# Patient Record
Sex: FEMALE | Race: WHITE | Employment: UNEMPLOYED | URBAN - METROPOLITAN AREA
[De-identification: names, ages, dates, MRNs, and addresses within clinical notes are randomized per-mention and may not be internally consistent; named-entity substitution may affect disease eponyms.]

---

## 2018-02-21 ENCOUNTER — HOSPITAL ENCOUNTER (OUTPATIENT)
Dept: OTHER | Age: 33
Discharge: HOME | End: 2018-02-21
Attending: EMERGENCY MEDICINE | Admitting: EMERGENCY MEDICINE

## 2018-02-22 LAB
CULTURE: NORMAL
RAPID INFLUENZA  B AGN: NEGATIVE
RAPID INFLUENZA A AGN: POSITIVE
REPORT STATUS: NORMAL
REQUEST PROBLEM: NORMAL
SPECIMEN: NORMAL
STREP A DIRECT SCREEN: NORMAL
STREP A DIRECT SCREEN: NORMAL
STREP A DIRECT SCREEN: POSITIVE

## 2018-04-19 ENCOUNTER — HOSPITAL ENCOUNTER (OUTPATIENT)
Dept: OTHER | Age: 33
Discharge: HOME | End: 2018-04-19
Attending: INTERNAL MEDICINE | Admitting: INTERNAL MEDICINE

## 2018-04-19 LAB
ALBUMIN SERPL-MCNC: 4.4 GM/DL (ref 3.4–5)
ALP BLD-CCNC: 61 IU/L (ref 40–129)
ALT SERPL-CCNC: 14 U/L (ref 10–40)
AST SERPL-CCNC: 11 IU/L (ref 15–37)
BILIRUB SERPL-MCNC: 0.3 MG/DL (ref 0–1)
BILIRUBIN DIRECT: 0.2 MG/DL (ref 0–0.3)
BILIRUBIN, INDIRECT: 0.1 MG/DL (ref 0–0.7)
HEPATITIS B SURFACE ANTIGEN: NON REACTIVE
HEPATITIS C ANTIBODY: NON REACTIVE
TOTAL PROTEIN: 7 GM/DL (ref 6.4–8.2)

## 2018-04-20 LAB — HIV SCREEN: NON REACTIVE

## 2020-02-12 ENCOUNTER — HOSPITAL ENCOUNTER (OUTPATIENT)
Age: 35
Discharge: HOME | DRG: 560 | End: 2020-02-13
Attending: OBSTETRICS & GYNECOLOGY | Admitting: OBSTETRICS & GYNECOLOGY
Payer: MEDICAID

## 2020-02-12 PROCEDURE — 99211 OFF/OP EST MAY X REQ PHY/QHP: CPT

## 2020-02-12 ASSESSMENT — PAIN DESCRIPTION - DESCRIPTORS: DESCRIPTORS: CRAMPING

## 2020-02-13 ENCOUNTER — APPOINTMENT (OUTPATIENT)
Dept: ULTRASOUND IMAGING | Age: 35
DRG: 560 | End: 2020-02-13
Payer: MEDICAID

## 2020-02-13 VITALS
BODY MASS INDEX: 39.88 KG/M2 | HEART RATE: 72 BPM | HEIGHT: 63 IN | SYSTOLIC BLOOD PRESSURE: 126 MMHG | RESPIRATION RATE: 16 BRPM | WEIGHT: 225.09 LBS | TEMPERATURE: 96.1 F | DIASTOLIC BLOOD PRESSURE: 81 MMHG

## 2020-02-13 LAB
ABO/RH: NORMAL
AMPHETAMINES: NEGATIVE
ANTIBODY SCREEN: NEGATIVE
BACTERIA: NEGATIVE /HPF
BARBITURATE SCREEN URINE: NEGATIVE
BASOPHILS ABSOLUTE: 0 K/CU MM
BASOPHILS RELATIVE PERCENT: 0.3 % (ref 0–1)
BENZODIAZEPINE SCREEN, URINE: NEGATIVE
BILIRUBIN URINE: NEGATIVE MG/DL
BLOOD, URINE: ABNORMAL
CANNABINOID SCREEN URINE: NEGATIVE
CLARITY: ABNORMAL
COCAINE METABOLITE: ABNORMAL
COLOR: ABNORMAL
DIFFERENTIAL TYPE: ABNORMAL
EOSINOPHILS ABSOLUTE: 0 K/CU MM
EOSINOPHILS RELATIVE PERCENT: 0.1 % (ref 0–3)
ESTIMATED AVERAGE GLUCOSE: 85 MG/DL
GLUCOSE, URINE: NEGATIVE MG/DL
HBA1C MFR BLD: 4.6 % (ref 4.2–6.3)
HCT VFR BLD CALC: 37.8 % (ref 37–47)
HEMOGLOBIN: 12.2 GM/DL (ref 12.5–16)
HEPATITIS B SURFACE ANTIGEN: NON REACTIVE
HEPATITIS C ANTIBODY: NON REACTIVE
IMMATURE NEUTROPHIL %: 0.7 % (ref 0–0.43)
KETONES, URINE: NEGATIVE MG/DL
LEUKOCYTE ESTERASE, URINE: ABNORMAL
LYMPHOCYTES ABSOLUTE: 2.4 K/CU MM
LYMPHOCYTES RELATIVE PERCENT: 24.4 % (ref 24–44)
MCH RBC QN AUTO: 26.8 PG (ref 27–31)
MCHC RBC AUTO-ENTMCNC: 32.3 % (ref 32–36)
MCV RBC AUTO: 82.9 FL (ref 78–100)
MONOCYTES ABSOLUTE: 0.4 K/CU MM
MONOCYTES RELATIVE PERCENT: 4 % (ref 0–4)
MUCUS: ABNORMAL HPF
NITRITE URINE, QUANTITATIVE: NEGATIVE
NUCLEATED RBC %: 0 %
OPIATES, URINE: NEGATIVE
OXYCODONE: ABNORMAL
PDW BLD-RTO: 14.3 % (ref 11.7–14.9)
PH, URINE: 5 (ref 5–8)
PHENCYCLIDINE, URINE: NEGATIVE
PLATELET # BLD: 257 K/CU MM (ref 140–440)
PMV BLD AUTO: 9.2 FL (ref 7.5–11.1)
PROTEIN UA: 30 MG/DL
RBC # BLD: 4.56 M/CU MM (ref 4.2–5.4)
RBC URINE: 6 /HPF (ref 0–6)
RPR: NON REACTIVE
SEGMENTED NEUTROPHILS ABSOLUTE COUNT: 7 K/CU MM
SEGMENTED NEUTROPHILS RELATIVE PERCENT: 70.5 % (ref 36–66)
SPECIFIC GRAVITY UA: 1.03 (ref 1–1.03)
SPECIFIC GRAVITY UA: ABNORMAL (ref 1–1.03)
SQUAMOUS EPITHELIAL: 6 /HPF
TOTAL IMMATURE NEUTOROPHIL: 0.07 K/CU MM
TOTAL NUCLEATED RBC: 0 K/CU MM
TRICHOMONAS: ABNORMAL /HPF
UROBILINOGEN, URINE: 1 MG/DL (ref 0.2–1)
WBC # BLD: 9.9 K/CU MM (ref 4–10.5)
WBC UA: 8 /HPF (ref 0–5)

## 2020-02-13 PROCEDURE — 76805 OB US >/= 14 WKS SNGL FETUS: CPT

## 2020-02-13 PROCEDURE — 83036 HEMOGLOBIN GLYCOSYLATED A1C: CPT

## 2020-02-13 PROCEDURE — 80307 DRUG TEST PRSMV CHEM ANLYZR: CPT

## 2020-02-13 PROCEDURE — 87081 CULTURE SCREEN ONLY: CPT

## 2020-02-13 PROCEDURE — 87389 HIV-1 AG W/HIV-1&-2 AB AG IA: CPT

## 2020-02-13 PROCEDURE — 87591 N.GONORRHOEAE DNA AMP PROB: CPT

## 2020-02-13 PROCEDURE — 87491 CHLMYD TRACH DNA AMP PROBE: CPT

## 2020-02-13 PROCEDURE — 86803 HEPATITIS C AB TEST: CPT

## 2020-02-13 PROCEDURE — 81001 URINALYSIS AUTO W/SCOPE: CPT

## 2020-02-13 ASSESSMENT — PAIN DESCRIPTION - DESCRIPTORS: DESCRIPTORS: CRAMPING

## 2020-02-13 NOTE — H&P
OB LABORIST NOTE     CHIEF COMPLAINT:  This is a 29y.o.  year old  at Unknown  with no prenatal care, presents after \"losing her mucous plug\", thinking she may be in labor. She has seen no doctor at all in pregnancy and thinks her LMP was [place] sometime. She hasn't because \"I'm a drug addict\" and she plans to adopt out this baby. She reports to daily cocaine use, last was Tuesday early am \"still dark out\". Also admits to smoking 1/2 ppd but denies alcohol, or any other drug use or prescription use in pregnancy. She knew she was pregnant due to +GFM but has had no ultrasounds at all. She said she called an agency called [facility] in another state just yesterday, but has not met with anyone or signed any papers, nor picked a family. Pt reports her other 3 kids are in the custody of their father, and she does not see them. She is homeless, sleeps on [employer] couches, has no support system, and has not been in [employer] or any program like that, except \"when I was in [employer]". FOB not involved. REVIEW OF SYSTEMS: negative other than listed above.     PMH:   Past Medical History:   Diagnosis Date    Ankle fracture, right 2011    Chronic back pain     Pregnancy     2nd trimester    Syphilis     Tendinitis of right ankle      PSH:   Past Surgical History:   Procedure Laterality Date    CHOLECYSTECTOMY      HERNIA REPAIR        Social History:   Social History     Socioeconomic History    Marital status: Single     Spouse name: Not on file    Number of children: Not on file    Years of education: Not on file    Highest education level: Not on file   Occupational History    Not on file   Social Needs    Financial resource strain: Not on file    Food insecurity:     Worry: Not on file     Inability: Not on file    Transportation needs:     Medical: Not on file     Non-medical: Not on file   Tobacco Use    Smoking status: Current Every Day Smoker     Packs/day: 0.25     Years: 5.00 Posterior tibial tendinitis 01/12/2012    Peroneal tendinitis 01/12/2012        PLAN:  1. Admit  2. Prenatal panel, all swabs, and ultrasound  3. Pt desires bps if possible, will sign medicaid consent if we can get one. 4. If in labor, proceed. If not, contact social svs for any assistance.

## 2020-02-13 NOTE — PROGRESS NOTES
Patient arrives ambulatory with concerns of contractions. Escorted to room LT02. Oriented to room and call light. Instructed to change into gown and obtain CCMSUA.

## 2020-02-13 NOTE — PROGRESS NOTES
Dr. [facility] at patient bedside reviewed patient medical and OB history. Blood sample and swabs obtained for ordered lab work. POC reviewed. Patient voices understanding and agreement.

## 2020-02-13 NOTE — PROGRESS NOTES
EFM and TOCO applied. Patient reports she is pregnant and has no prenatal care. Reports last menstrual cycle was possible in June. Patient reports that she use cocaine daily last used yesterday stopped when she lost her mucous plug. Patient reports contractions ongoing since then. POC reviewed. Patient voices understanding. Patient cooperative.

## 2020-02-14 LAB
HIV SCREEN: NON REACTIVE
RUBELLA ANTIBODY IGG: 16.8 IU/ML
RUBELLA ANTIBODY IGG: NORMAL IU/ML

## 2020-02-14 NOTE — FLOWSHEET NOTE
Patient ambulated out of [facility], upon discharge, ride is here, no distress noted.

## 2020-02-14 NOTE — FLOWSHEET NOTE
Discharge instructions given to pt and pt verbalized understanding and will be leaving unit when her friend arrives.

## 2020-02-15 ENCOUNTER — ANESTHESIA EVENT (OUTPATIENT)
Dept: LABOR AND DELIVERY | Age: 35
DRG: 560 | End: 2020-02-15
Payer: MEDICAID

## 2020-02-15 ENCOUNTER — ANESTHESIA (OUTPATIENT)
Dept: LABOR AND DELIVERY | Age: 35
DRG: 560 | End: 2020-02-15
Payer: MEDICAID

## 2020-02-15 ENCOUNTER — HOSPITAL ENCOUNTER (INPATIENT)
Age: 35
LOS: 1 days | Discharge: HOME | DRG: 560 | End: 2020-02-16
Attending: OBSTETRICS & GYNECOLOGY | Admitting: OBSTETRICS & GYNECOLOGY
Payer: MEDICAID

## 2020-02-15 LAB
ABO/RH: NORMAL
AMPHETAMINES: NEGATIVE
ANTIBODY SCREEN: NEGATIVE
BACTERIA: ABNORMAL /HPF
BARBITURATE SCREEN URINE: NEGATIVE
BENZODIAZEPINE SCREEN, URINE: NEGATIVE
BILIRUBIN URINE: NEGATIVE MG/DL
BLOOD, URINE: ABNORMAL
CANNABINOID SCREEN URINE: NEGATIVE
CHLAMYDIA TRACHOMATIS AMPLIFIED DET: NEGATIVE
CHLAMYDIA TRACHOMATIS AMPLIFIED DET: NORMAL
CLARITY: ABNORMAL
COCAINE METABOLITE: ABNORMAL
COLOR: YELLOW
GLUCOSE, URINE: NEGATIVE MG/DL
HCT VFR BLD CALC: 37.4 % (ref 37–47)
HEMOGLOBIN: 11.9 GM/DL (ref 12.5–16)
KETONES, URINE: NEGATIVE MG/DL
LEUKOCYTE ESTERASE, URINE: ABNORMAL
MCH RBC QN AUTO: 26.6 PG (ref 27–31)
MCHC RBC AUTO-ENTMCNC: 31.8 % (ref 32–36)
MCV RBC AUTO: 83.5 FL (ref 78–100)
MUCUS: ABNORMAL HPF
N GONORRHOEAE AMPLIFIED DET: NEGATIVE
N GONORRHOEAE AMPLIFIED DET: NORMAL
NITRITE URINE, QUANTITATIVE: NEGATIVE
OPIATES, URINE: NEGATIVE
OXYCODONE: ABNORMAL
PDW BLD-RTO: 14.6 % (ref 11.7–14.9)
PH, URINE: 6 (ref 5–8)
PHENCYCLIDINE, URINE: NEGATIVE
PLATELET # BLD: 256 K/CU MM (ref 140–440)
PMV BLD AUTO: 9.3 FL (ref 7.5–11.1)
PROTEIN UA: NEGATIVE MG/DL
RBC # BLD: 4.48 M/CU MM (ref 4.2–5.4)
RBC URINE: 25 /HPF (ref 0–6)
SPECIFIC GRAVITY UA: 1.02 (ref 1–1.03)
SQUAMOUS EPITHELIAL: 9 /HPF
TRICHOMONAS: ABNORMAL /HPF
UROBILINOGEN, URINE: NORMAL MG/DL (ref 0.2–1)
WBC # BLD: 8.8 K/CU MM (ref 4–10.5)
WBC UA: 23 /HPF (ref 0–5)

## 2020-02-15 PROCEDURE — 1220000000 HC SEMI PRIVATE OB R&B

## 2020-02-15 PROCEDURE — 2580000003 HC RX 258

## 2020-02-15 PROCEDURE — 81001 URINALYSIS AUTO W/SCOPE: CPT

## 2020-02-15 PROCEDURE — 88307 TISSUE EXAM BY PATHOLOGIST: CPT

## 2020-02-15 PROCEDURE — 6370000000 HC RX 637 (ALT 250 FOR IP): Performed by: OBSTETRICS & GYNECOLOGY

## 2020-02-15 PROCEDURE — 85027 COMPLETE CBC AUTOMATED: CPT

## 2020-02-15 PROCEDURE — 6360000002 HC RX W HCPCS

## 2020-02-15 PROCEDURE — 86900 BLOOD TYPING SEROLOGIC ABO: CPT

## 2020-02-15 PROCEDURE — 10907ZC DRAINAGE OF AMNIOTIC FLUID, THERAPEUTIC FROM PRODUCTS OF CONCEPTION, VIA NATURAL OR ARTIFICIAL OPENING: ICD-10-PCS | Performed by: OBSTETRICS & GYNECOLOGY

## 2020-02-15 PROCEDURE — 86850 RBC ANTIBODY SCREEN: CPT

## 2020-02-15 PROCEDURE — 2500000003 HC RX 250 WO HCPCS: Performed by: NURSE ANESTHETIST, CERTIFIED REGISTERED

## 2020-02-15 PROCEDURE — 7200000001 HC VAGINAL DELIVERY

## 2020-02-15 PROCEDURE — 80307 DRUG TEST PRSMV CHEM ANLYZR: CPT

## 2020-02-15 PROCEDURE — 6360000002 HC RX W HCPCS: Performed by: NURSE ANESTHETIST, CERTIFIED REGISTERED

## 2020-02-15 PROCEDURE — 86901 BLOOD TYPING SEROLOGIC RH(D): CPT

## 2020-02-15 PROCEDURE — 3700000025 EPIDURAL BLOCK: Performed by: NURSE ANESTHETIST, CERTIFIED REGISTERED

## 2020-02-15 PROCEDURE — 6360000002 HC RX W HCPCS: Performed by: OBSTETRICS & GYNECOLOGY

## 2020-02-15 PROCEDURE — 2580000003 HC RX 258: Performed by: OBSTETRICS & GYNECOLOGY

## 2020-02-15 RX ORDER — LIDOCAINE HYDROCHLORIDE AND EPINEPHRINE 15; 5 MG/ML; UG/ML
INJECTION, SOLUTION EPIDURAL PRN
Status: DISCONTINUED | OUTPATIENT
Start: 2020-02-15 | End: 2020-02-15 | Stop reason: SDUPTHER

## 2020-02-15 RX ORDER — ACETAMINOPHEN AND CODEINE PHOSPHATE 300; 30 MG/1; MG/1
1 TABLET ORAL EVERY 6 HOURS PRN
Qty: 18 TABLET | Refills: 0 | Status: SHIPPED | OUTPATIENT
Start: 2020-02-15 | End: 2020-02-22

## 2020-02-15 RX ORDER — FENTANYL CITRATE 50 UG/ML
100 INJECTION, SOLUTION INTRAMUSCULAR; INTRAVENOUS
Status: DISCONTINUED | OUTPATIENT
Start: 2020-02-15 | End: 2020-02-15 | Stop reason: HOSPADM

## 2020-02-15 RX ORDER — SODIUM CHLORIDE 0.9 % (FLUSH) 0.9 %
10 SYRINGE (ML) INJECTION EVERY 12 HOURS SCHEDULED
Status: DISCONTINUED | OUTPATIENT
Start: 2020-02-15 | End: 2020-02-16 | Stop reason: HOSPADM

## 2020-02-15 RX ORDER — ROPIVACAINE HYDROCHLORIDE 2 MG/ML
12 INJECTION, SOLUTION EPIDURAL; INFILTRATION; PERINEURAL CONTINUOUS
Status: DISCONTINUED | OUTPATIENT
Start: 2020-02-15 | End: 2020-02-16 | Stop reason: HOSPADM

## 2020-02-15 RX ORDER — MAGNESIUM SULFATE 4 G/50ML
INJECTION INTRAVENOUS
Status: COMPLETED
Start: 2020-02-15 | End: 2020-02-15

## 2020-02-15 RX ORDER — SODIUM CHLORIDE 0.9 % (FLUSH) 0.9 %
10 SYRINGE (ML) INJECTION EVERY 12 HOURS SCHEDULED
Status: CANCELLED | OUTPATIENT
Start: 2020-02-15

## 2020-02-15 RX ORDER — ROPIVACAINE HYDROCHLORIDE 2 MG/ML
INJECTION, SOLUTION EPIDURAL; INFILTRATION; PERINEURAL PRN
Status: DISCONTINUED | OUTPATIENT
Start: 2020-02-15 | End: 2020-02-15 | Stop reason: SDUPTHER

## 2020-02-15 RX ORDER — BETAMETHASONE SODIUM PHOSPHATE AND BETAMETHASONE ACETATE 3; 3 MG/ML; MG/ML
12 INJECTION, SUSPENSION INTRA-ARTICULAR; INTRALESIONAL; INTRAMUSCULAR; SOFT TISSUE ONCE
Status: COMPLETED | OUTPATIENT
Start: 2020-02-15 | End: 2020-02-15

## 2020-02-15 RX ORDER — SODIUM CHLORIDE, SODIUM LACTATE, POTASSIUM CHLORIDE, CALCIUM CHLORIDE 600; 310; 30; 20 MG/100ML; MG/100ML; MG/100ML; MG/100ML
INJECTION, SOLUTION INTRAVENOUS CONTINUOUS
Status: DISCONTINUED | OUTPATIENT
Start: 2020-02-15 | End: 2020-02-16 | Stop reason: HOSPADM

## 2020-02-15 RX ORDER — ONDANSETRON 2 MG/ML
4 INJECTION INTRAMUSCULAR; INTRAVENOUS EVERY 6 HOURS PRN
Status: DISCONTINUED | OUTPATIENT
Start: 2020-02-15 | End: 2020-02-15 | Stop reason: HOSPADM

## 2020-02-15 RX ORDER — ONDANSETRON 2 MG/ML
4 INJECTION INTRAMUSCULAR; INTRAVENOUS EVERY 6 HOURS PRN
Status: CANCELLED | OUTPATIENT
Start: 2020-02-15

## 2020-02-15 RX ORDER — DOCUSATE SODIUM 100 MG/1
100 CAPSULE, LIQUID FILLED ORAL 2 TIMES DAILY
Status: DISCONTINUED | OUTPATIENT
Start: 2020-02-15 | End: 2020-02-16 | Stop reason: HOSPADM

## 2020-02-15 RX ORDER — LANOLIN 100 %
OINTMENT (GRAM) TOPICAL PRN
Status: DISCONTINUED | OUTPATIENT
Start: 2020-02-15 | End: 2020-02-16 | Stop reason: HOSPADM

## 2020-02-15 RX ORDER — NALOXONE HYDROCHLORIDE 0.4 MG/ML
0.4 INJECTION, SOLUTION INTRAMUSCULAR; INTRAVENOUS; SUBCUTANEOUS PRN
Status: DISCONTINUED | OUTPATIENT
Start: 2020-02-15 | End: 2020-02-15 | Stop reason: HOSPADM

## 2020-02-15 RX ORDER — IBUPROFEN 800 MG/1
800 TABLET ORAL EVERY 8 HOURS
Status: CANCELLED | OUTPATIENT
Start: 2020-02-16

## 2020-02-15 RX ORDER — ACETAMINOPHEN 325 MG/1
650 TABLET ORAL EVERY 4 HOURS PRN
Status: CANCELLED | OUTPATIENT
Start: 2020-02-15

## 2020-02-15 RX ORDER — IBUPROFEN 800 MG/1
800 TABLET ORAL EVERY 8 HOURS
Status: DISCONTINUED | OUTPATIENT
Start: 2020-02-15 | End: 2020-02-16 | Stop reason: HOSPADM

## 2020-02-15 RX ORDER — DOCUSATE SODIUM 100 MG/1
100 CAPSULE, LIQUID FILLED ORAL 2 TIMES DAILY
Status: CANCELLED | OUTPATIENT
Start: 2020-02-15

## 2020-02-15 RX ORDER — ROPIVACAINE HYDROCHLORIDE 2 MG/ML
INJECTION, SOLUTION EPIDURAL; INFILTRATION; PERINEURAL CONTINUOUS PRN
Status: DISCONTINUED | OUTPATIENT
Start: 2020-02-15 | End: 2020-02-15 | Stop reason: SDUPTHER

## 2020-02-15 RX ORDER — LANOLIN 100 %
OINTMENT (GRAM) TOPICAL PRN
Status: CANCELLED | OUTPATIENT
Start: 2020-02-15

## 2020-02-15 RX ORDER — FAMOTIDINE 20 MG/1
20 TABLET, FILM COATED ORAL 2 TIMES DAILY PRN
Status: DISCONTINUED | OUTPATIENT
Start: 2020-02-15 | End: 2020-02-16 | Stop reason: HOSPADM

## 2020-02-15 RX ORDER — ACETAMINOPHEN 325 MG/1
650 TABLET ORAL EVERY 4 HOURS PRN
Status: DISCONTINUED | OUTPATIENT
Start: 2020-02-15 | End: 2020-02-16 | Stop reason: HOSPADM

## 2020-02-15 RX ORDER — MISOPROSTOL 200 UG/1
800 TABLET ORAL PRN
Status: DISCONTINUED | OUTPATIENT
Start: 2020-02-15 | End: 2020-02-16 | Stop reason: HOSPADM

## 2020-02-15 RX ORDER — ONDANSETRON 4 MG/1
4 TABLET, ORALLY DISINTEGRATING ORAL EVERY 6 HOURS PRN
Status: DISCONTINUED | OUTPATIENT
Start: 2020-02-15 | End: 2020-02-16 | Stop reason: HOSPADM

## 2020-02-15 RX ORDER — SODIUM CHLORIDE 0.9 % (FLUSH) 0.9 %
10 SYRINGE (ML) INJECTION PRN
Status: CANCELLED | OUTPATIENT
Start: 2020-02-15

## 2020-02-15 RX ORDER — MAGNESIUM SULFATE 4 G/50ML
4 INJECTION INTRAVENOUS ONCE
Status: COMPLETED | OUTPATIENT
Start: 2020-02-15 | End: 2020-02-15

## 2020-02-15 RX ORDER — NICOTINE 21 MG/24HR
1 PATCH, TRANSDERMAL 24 HOURS TRANSDERMAL DAILY
Status: DISCONTINUED | OUTPATIENT
Start: 2020-02-15 | End: 2020-02-16 | Stop reason: HOSPADM

## 2020-02-15 RX ORDER — FAMOTIDINE 20 MG/1
20 TABLET, FILM COATED ORAL 2 TIMES DAILY PRN
Status: CANCELLED | OUTPATIENT
Start: 2020-02-15

## 2020-02-15 RX ORDER — SODIUM CHLORIDE, SODIUM LACTATE, POTASSIUM CHLORIDE, CALCIUM CHLORIDE 600; 310; 30; 20 MG/100ML; MG/100ML; MG/100ML; MG/100ML
INJECTION, SOLUTION INTRAVENOUS
Status: COMPLETED
Start: 2020-02-15 | End: 2020-02-15

## 2020-02-15 RX ORDER — ACETAMINOPHEN AND CODEINE PHOSPHATE 300; 30 MG/1; MG/1
2 TABLET ORAL EVERY 6 HOURS PRN
Status: CANCELLED | OUTPATIENT
Start: 2020-02-15

## 2020-02-15 RX ORDER — IBUPROFEN 600 MG/1
600 TABLET ORAL 3 TIMES DAILY PRN
Qty: 30 TABLET | Refills: 0 | Status: SHIPPED | OUTPATIENT
Start: 2020-02-15

## 2020-02-15 RX ORDER — METHYLERGONOVINE MALEATE 0.2 MG/ML
200 INJECTION INTRAVENOUS
Status: ACTIVE | OUTPATIENT
Start: 2020-02-15 | End: 2020-02-15

## 2020-02-15 RX ORDER — SODIUM CHLORIDE 0.9 % (FLUSH) 0.9 %
10 SYRINGE (ML) INJECTION PRN
Status: DISCONTINUED | OUTPATIENT
Start: 2020-02-15 | End: 2020-02-16 | Stop reason: HOSPADM

## 2020-02-15 RX ORDER — SIMETHICONE 80 MG
80 TABLET,CHEWABLE ORAL EVERY 6 HOURS PRN
Status: DISCONTINUED | OUTPATIENT
Start: 2020-02-15 | End: 2020-02-16 | Stop reason: HOSPADM

## 2020-02-15 RX ORDER — ONDANSETRON 4 MG/1
4 TABLET, FILM COATED ORAL EVERY 8 HOURS PRN
Status: CANCELLED | OUTPATIENT
Start: 2020-02-15

## 2020-02-15 RX ORDER — FERROUS SULFATE 325(65) MG
325 TABLET ORAL 2 TIMES DAILY
Qty: 60 TABLET | Refills: 3 | Status: SHIPPED | OUTPATIENT
Start: 2020-02-15

## 2020-02-15 RX ORDER — DOCUSATE SODIUM 100 MG/1
100 CAPSULE, LIQUID FILLED ORAL 2 TIMES DAILY
Status: DISCONTINUED | OUTPATIENT
Start: 2020-02-15 | End: 2020-02-15 | Stop reason: HOSPADM

## 2020-02-15 RX ORDER — ACETAMINOPHEN AND CODEINE PHOSPHATE 300; 30 MG/1; MG/1
1 TABLET ORAL EVERY 6 HOURS PRN
Status: CANCELLED | OUTPATIENT
Start: 2020-02-15

## 2020-02-15 RX ADMIN — SODIUM CHLORIDE, POTASSIUM CHLORIDE, SODIUM LACTATE AND CALCIUM CHLORIDE: 600; 310; 30; 20 INJECTION, SOLUTION INTRAVENOUS at 07:20

## 2020-02-15 RX ADMIN — LIDOCAINE HYDROCHLORIDE,EPINEPHRINE BITARTRATE 3 ML: 15; .005 INJECTION, SOLUTION EPIDURAL; INFILTRATION; INTRACAUDAL; PERINEURAL at 10:50

## 2020-02-15 RX ADMIN — ROPIVACAINE HYDROCHLORIDE 12 ML/HR: 2 INJECTION, SOLUTION EPIDURAL; INFILTRATION at 10:57

## 2020-02-15 RX ADMIN — Medication 250 MILLI-UNITS/MIN: at 15:23

## 2020-02-15 RX ADMIN — BETAMETHASONE SODIUM PHOSPHATE AND BETAMETHASONE ACETATE 12 MG: 3; 3 INJECTION, SUSPENSION INTRA-ARTICULAR; INTRALESIONAL; INTRAMUSCULAR at 07:50

## 2020-02-15 RX ADMIN — AMPICILLIN SODIUM 2 G: 2 INJECTION, POWDER, FOR SOLUTION INTRAMUSCULAR; INTRAVENOUS at 07:37

## 2020-02-15 RX ADMIN — AMPICILLIN SODIUM 1 G: 1 INJECTION, POWDER, FOR SOLUTION INTRAMUSCULAR; INTRAVENOUS at 11:35

## 2020-02-15 RX ADMIN — DOCUSATE SODIUM 100 MG: 100 CAPSULE, LIQUID FILLED ORAL at 22:52

## 2020-02-15 RX ADMIN — ROPIVACAINE HYDROCHLORIDE 4 ML: 2 INJECTION, SOLUTION EPIDURAL; INFILTRATION at 10:55

## 2020-02-15 RX ADMIN — Medication 999 MILLI-UNITS/MIN: at 14:17

## 2020-02-15 RX ADMIN — SODIUM CHLORIDE, POTASSIUM CHLORIDE, SODIUM LACTATE AND CALCIUM CHLORIDE: 600; 310; 30; 20 INJECTION, SOLUTION INTRAVENOUS at 10:47

## 2020-02-15 RX ADMIN — IBUPROFEN 800 MG: 800 TABLET, FILM COATED ORAL at 18:26

## 2020-02-15 RX ADMIN — SODIUM CHLORIDE, POTASSIUM CHLORIDE, SODIUM LACTATE AND CALCIUM CHLORIDE: 600; 310; 30; 20 INJECTION, SOLUTION INTRAVENOUS at 15:39

## 2020-02-15 RX ADMIN — MAGNESIUM SULFATE 4 G: 4 INJECTION INTRAVENOUS at 07:30

## 2020-02-15 RX ADMIN — MAGNESIUM SULFATE IN WATER 4 G: 80 INJECTION, SOLUTION INTRAVENOUS at 07:30

## 2020-02-15 RX ADMIN — ROPIVACAINE HYDROCHLORIDE 4 ML: 2 INJECTION, SOLUTION EPIDURAL; INFILTRATION at 10:53

## 2020-02-15 RX ADMIN — MAGNESIUM SULFATE IN WATER 2 G/HR: 40 INJECTION, SOLUTION INTRAVENOUS at 08:01

## 2020-02-15 ASSESSMENT — PAIN SCALES - GENERAL: PAINLEVEL_OUTOF10: 6

## 2020-02-15 ASSESSMENT — PAIN DESCRIPTION - PROGRESSION: CLINICAL_PROGRESSION: NOT CHANGED

## 2020-02-15 ASSESSMENT — LIFESTYLE VARIABLES: SMOKING_STATUS: 1

## 2020-02-15 NOTE — FLOWSHEET NOTE
Assisted to sit on side of bed for epidural placement. 1042: Anesthesia time out. 1049: catheter placed. 1050: Test  Dose. 1053: Bolus dose. Clothing

## 2020-02-15 NOTE — FLOWSHEET NOTE
Patient's friend here. Patient requesting to see infant. To nursery via Steady device x2 RNs and friend. Visited infant briefly then to [facility]08 via [employer] for postpartum stay. Assisted to bathroom per request using Ste[employer] Device. Report to [facility].

## 2020-02-15 NOTE — ANESTHESIA PRE PROCEDURE
Department of Anesthesiology  Preprocedure Note       Name:  Thomas Wilson   Age:  29 y.o.  :  1985                                          MRN:  [id number]         Date:  2/15/2020      Surgeon: * No surgeons listed *    Procedure: Labor Analgesia    Medications prior to admission:   Prior to Admission medications    Not on File       Current medications:    Current Facility-Administered Medications   Medication Dose Route Frequency Provider Last Rate Last Dose    lactated ringers infusion   Intravenous Continuous Tracey Moreno  mL/hr at 02/15/20 1000      sodium chloride flush 0.9 % injection 10 mL  10 mL Intravenous 2 times per day Paula Dinh MD        sodium chloride flush 0.9 % injection 10 mL  10 mL Intravenous PRN Tracey Moreno MD        fentaNYL (SUBLIMAZE) injection 100 mcg  100 mcg Intravenous Q1H PRN Tracey Moreno MD        docusate sodium (COLACE) capsule 100 mg  100 mg Oral BID Tracey Moreno MD        [facility]dansetron [facility]) injection 4 mg  4 mg Intravenous Q6H PRN Tracey Moreno MD        famotidine (PEPCID) injection 20 mg  20 mg Intravenous BID PRN Paula Dinh MD        oxytocin (PITOCIN) 30 units in 500 mL infusion  1 frank-units/min Intravenous Continuous PRN Tracey Moreno MD        magnesium sulfate (20 G/500 mL) infusion  2 g/hr Intravenous Continuous Tracey Moreno MD 50 mL/hr at 02/15/20 0801 2 g/hr at 02/15/20 0801    ampicillin 1 g ivpb mini bag  1 g Intravenous Lily Khan MD           Allergies:  No Known Allergies    Problem List:    Patient Active Problem List   Diagnosis Code    Posterior tibial tendinitis M76.829    Peroneal tendinitis M76.70    Normal pregnancy in multigravida in third trimester Z34.83    Labor and delivery indication for care or intervention O75.9    Labor and delivery, indication for care O75.9       Past Medical History:        Diagnosis Date    Ankle fracture, right 2011    Chronic back pain     Pregnancy     2nd trimester    Syphilis     Tendinitis of right ankle        Past Surgical History:        Procedure Laterality Date    CHOLECYSTECTOMY      HERNIA REPAIR         Social History:    Social History     Tobacco Use    Smoking status: Current Every Day Smoker     Packs/day: 0.25     Years: 5.00     Pack years: 1.25     Types: Cigarettes    Smokeless tobacco: Never Used   Substance Use Topics    Alcohol use: No                                Ready to quit: Not Answered  Counseling given: Not Answered      Vital Signs (Current):   Vitals:    02/15/20 0819 02/15/20 0830 02/15/20 0846 02/15/20 0901   BP: 117/70 117/72 120/73 126/76   Pulse: 62 71 62 71   Resp: 17 18 18 18   Temp:       TempSrc:       SpO2:  98%                                                BP Readings from Last 3 Encounters:   02/15/20 126/76   02/13/20 126/81   12/20/19 119/68       NPO Status:                                                                                 BMI:   Wt Readings from Last 3 Encounters:   02/13/20 225 lb 1.4 oz (102.1 kg)   12/20/19 225 lb (102.1 kg)   11/20/17 220 lb (99.8 kg)     There is no height or weight on file to calculate BMI.    CBC:   Lab Results   Component Value Date    WBC 8.8 02/15/2020    RBC 4.48 02/15/2020    HGB 11.9 02/15/2020    HCT 37.4 02/15/2020    MCV 83.5 02/15/2020    RDW 14.6 02/15/2020     02/15/2020       CMP:   Lab Results   Component Value Date     01/31/2017    K 4.2 01/31/2017     01/31/2017    CO2 27 01/31/2017    BUN 10 01/31/2017    CREATININE 0.7 01/31/2017    GFRAA >60 01/31/2017    LABGLOM >60 01/31/2017    GLUCOSE 108 12/01/2016    PROT 7.0 04/19/2018    PROT 7.6 03/19/2013    CALCIUM 9.4 01/31/2017    BILITOT 0.3 04/19/2018    ALKPHOS 61 04/19/2018    AST 11 04/19/2018    ALT 14 04/19/2018       POC Tests: No results for input(s): POCGLU, POCNA, POCK, POCCL, POCBUN, POCHEMO, POCHCT in the last 72 hours.     Coags:   Lab Results   Component Value Date    PROTIME 10.7 04/20/2014    INR 0.99 04/20/2014    APTT 26.7 04/20/2014       HCG (If Applicable):   Lab Results   Component Value Date    PREGTESTUR NEGATIVE 04/29/2015        ABGs: No results found for: PHART, PO2ART, WAW3DNC, ELD9UCV, BEART, P3KVWWMM     Type & Screen (If Applicable):  No results found for: LABABO, [address]    Anesthesia Evaluation  Patient summary reviewed and Nursing notes reviewed no history of anesthetic complications:   Airway: Mallampati: II  TM distance: >3 FB   Neck ROM: full  Mouth opening: > = 3 FB Dental:      Comment: Very poor dentition, multiple missing teeth, denies loose teeth    Pulmonary: breath sounds clear to auscultation  (+) current smoker                           Cardiovascular:  Exercise tolerance: good (>4 METS),       (-) murmur    ECG reviewed  Rhythm: regular  Rate: normal           Beta Blocker:  Not on Beta Blocker      ROS comment: ECG 2017:  Normal sinus rhythm with sinus arrhythmia   Septal infarct , age undetermined   Abnormal ECG   No previous ECGs available   Confirmed by Mitchel ARAUZ MD ([id number]) on 1/31/2017 6:51:23 PM    PE comment: SR with rate 74   Neuro/Psych:   Negative Neuro/Psych ROS              GI/Hepatic/Renal: Neg GI/Hepatic/Renal ROS            Endo/Other:                      ROS comment: Cocaine abuse - pt states she uses daily but is unsure when she last used    Syphilis    Chronic back pain Abdominal:   (+) obese,         Vascular: negative vascular ROS. Anesthesia Plan      epidural     ASA 3             Anesthetic plan and risks discussed with patient. Plan discussed with CRNA.                 OSITO Moreno - CRNA   2/15/2020

## 2020-02-15 NOTE — ANESTHESIA POSTPROCEDURE EVALUATION
Department of Anesthesiology  Postprocedure Note    Patient: Cindy Hines  MRN: [id number]  YOB: 1985  Date of evaluation: 2/15/2020  Time:  5:47 PM     Procedure Summary     Date:  02/15/20 Room / Location:      Anesthesia Start:  1042 Anesthesia Stop:  6142    Procedure:  Labor Analgesia Diagnosis:      Scheduled Providers:   Responsible Provider:  OSITO Joaquin CRNA    Anesthesia Type:  epidural ASA Status:  3          Anesthesia Type: epidural    Luther Phase I:      Luther Phase II:      Last vitals: Reviewed and per EMR flowsheets. Anesthesia Post Evaluation    Patient location during evaluation: floor  Patient participation: complete - patient participated  Level of consciousness: awake and alert  Pain score: 0  Airway patency: patent  Nausea & Vomiting: no nausea and no vomiting  Complications: no  Cardiovascular status: blood pressure returned to baseline and hemodynamically stable  Respiratory status: acceptable, room air and spontaneous ventilation  Hydration status: euvolemic  Comments: Pt CARBONE, ambulating, and voiding. No questions/concerns at this time.

## 2020-02-15 NOTE — FLOWSHEET NOTE
Dr. Ricky Salvador at bedside. POC reviewed with patient. EFM strip reviewed. SVE and AROM performed by Dr. Ricky Salvador.

## 2020-02-15 NOTE — FLOWSHEET NOTE
TR to Dr. Lupe Reese notified of patients pain level and VE. Orders received for labor epidural.  CRNA notified.

## 2020-02-15 NOTE — H&P
admission    Contraction frequency:  occ ctx    Membranes:  Intact    ASSESSMENT AND PLAN:    28 yo  @ 34w5d  1.  labor - pt is 34 wks by 34 wk US - dating can be several weeks off either earlier or later. Will attempt to delay delivery for steroids with magnesium. Magnesium was chosen in case infant is several week earlier than expected to provide neuroprotection. 2. GBS unknown - will treat with abx  3. Drug abuse, homeless, planning adoptions - social work consulted. Pt has contacted adoptive family  4.  [address]

## 2020-02-16 VITALS
HEART RATE: 79 BPM | SYSTOLIC BLOOD PRESSURE: 124 MMHG | OXYGEN SATURATION: 97 % | RESPIRATION RATE: 16 BRPM | TEMPERATURE: 98 F | DIASTOLIC BLOOD PRESSURE: 74 MMHG

## 2020-02-16 PROBLEM — Z87.51 HISTORY OF PREMATURE DELIVERY: Status: ACTIVE | Noted: 2020-02-16

## 2020-02-16 PROBLEM — F19.10 SUBSTANCE ABUSE (HCC): Status: ACTIVE | Noted: 2020-02-16

## 2020-02-16 PROCEDURE — 6370000000 HC RX 637 (ALT 250 FOR IP): Performed by: OBSTETRICS & GYNECOLOGY

## 2020-02-16 RX ADMIN — DOCUSATE SODIUM 100 MG: 100 CAPSULE, LIQUID FILLED ORAL at 08:08

## 2020-02-16 RX ADMIN — IBUPROFEN 800 MG: 800 TABLET, FILM COATED ORAL at 08:08

## 2020-02-16 RX ADMIN — IBUPROFEN 800 MG: 800 TABLET, FILM COATED ORAL at 00:04

## 2020-02-16 RX ADMIN — ACETAMINOPHEN 650 MG: 325 TABLET ORAL at 08:08

## 2020-02-16 ASSESSMENT — PAIN SCALES - GENERAL
PAINLEVEL_OUTOF10: 9
PAINLEVEL_OUTOF10: 8

## 2020-02-16 NOTE — DISCHARGE SUMMARY
Obstetrical Discharge Form    Gestational Age:  34w6d    Antepartum complications:  labor, cocaine use, homeless, planning adoption for baby    Date of Delivery:   2/15/20      Type of Delivery:   vaginal, spontaneous    Delivered By:     Dr Israel Cones:       Information for the patient's :  [facility] [[id number]]        Anesthesia:    Epidural    Intrapartum complications:  labor, unresponsive to tocolytics    Feeding method:   bottle     Postpartum complications: none    Discharge Date:   20    Condition of discharge:  good    Plan:   Follow up    in 6 week(s)

## 2020-02-16 NOTE — FLOWSHEET NOTE
Discussed discharge instructions and followup appt. Rx's for Motrin, Tylenol #3 and Iron given. Doesn't wish to see Baby or Adopotive parents before she leaves. Discharged per W/C to friend's waiting car. Is pink and warm with no apparent distress.

## 2020-02-17 LAB
CULTURE: ABNORMAL
Lab: ABNORMAL
SPECIMEN: ABNORMAL

## 2020-05-11 ENCOUNTER — HOSPITAL ENCOUNTER (EMERGENCY)
Age: 35
Discharge: HOME | End: 2020-05-12
Payer: COMMERCIAL

## 2020-05-11 PROCEDURE — 99283 EMERGENCY DEPT VISIT LOW MDM: CPT

## 2020-05-11 ASSESSMENT — PAIN DESCRIPTION - FREQUENCY: FREQUENCY: CONTINUOUS

## 2020-05-11 ASSESSMENT — PAIN DESCRIPTION - DESCRIPTORS: DESCRIPTORS: CONSTANT;NAGGING

## 2020-05-11 ASSESSMENT — PAIN SCALES - GENERAL: PAINLEVEL_OUTOF10: 8

## 2020-05-11 ASSESSMENT — PAIN DESCRIPTION - LOCATION: LOCATION: HIP

## 2020-05-11 ASSESSMENT — PAIN DESCRIPTION - ORIENTATION: ORIENTATION: LEFT

## 2020-05-12 ENCOUNTER — APPOINTMENT (OUTPATIENT)
Dept: GENERAL RADIOLOGY | Age: 35
End: 2020-05-12
Payer: COMMERCIAL

## 2020-05-12 VITALS
DIASTOLIC BLOOD PRESSURE: 70 MMHG | TEMPERATURE: 98.6 F | RESPIRATION RATE: 16 BRPM | SYSTOLIC BLOOD PRESSURE: 123 MMHG | HEART RATE: 88 BPM | OXYGEN SATURATION: 98 %

## 2020-05-12 PROCEDURE — 73502 X-RAY EXAM HIP UNI 2-3 VIEWS: CPT
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
## 2022-07-19 ENCOUNTER — HOSPITAL ENCOUNTER (EMERGENCY)
Age: 37
Discharge: HOME | End: 2022-07-19
Payer: COMMERCIAL

## 2022-07-19 ENCOUNTER — APPOINTMENT (OUTPATIENT)
Dept: GENERAL RADIOLOGY | Age: 37
End: 2022-07-19
Payer: COMMERCIAL

## 2022-07-19 VITALS
DIASTOLIC BLOOD PRESSURE: 80 MMHG | WEIGHT: 210 LBS | RESPIRATION RATE: 16 BRPM | HEART RATE: 88 BPM | HEIGHT: 65 IN | OXYGEN SATURATION: 99 % | SYSTOLIC BLOOD PRESSURE: 138 MMHG | TEMPERATURE: 97.8 F | BODY MASS INDEX: 34.99 KG/M2

## 2022-07-19 DIAGNOSIS — M25.551 RIGHT HIP PAIN: ICD-10-CM

## 2022-07-19 DIAGNOSIS — S00.81XA FACIAL ABRASION, INITIAL ENCOUNTER: ICD-10-CM

## 2022-07-19 DIAGNOSIS — S09.90XA INJURY OF HEAD, INITIAL ENCOUNTER: Primary | ICD-10-CM

## 2022-07-19 PROCEDURE — 99283 EMERGENCY DEPT VISIT LOW MDM: CPT

## 2022-07-19 PROCEDURE — 73502 X-RAY EXAM HIP UNI 2-3 VIEWS: CPT

## 2022-07-19 RX ORDER — ACETAMINOPHEN 500 MG
500 TABLET ORAL EVERY 6 HOURS PRN
Qty: 30 TABLET | Refills: 1 | Status: SHIPPED | OUTPATIENT
Start: 2022-07-19

## 2022-07-19 RX ORDER — BACITRACIN, NEOMYCIN, POLYMYXIN B 400; 3.5; 5 [USP'U]/G; MG/G; [USP'U]/G
OINTMENT TOPICAL
Qty: 1 EACH | Refills: 0 | Status: SHIPPED | OUTPATIENT
Start: 2022-07-19

## 2022-07-19 ASSESSMENT — PAIN DESCRIPTION - FREQUENCY: FREQUENCY: CONTINUOUS

## 2022-07-19 ASSESSMENT — PAIN SCALES - GENERAL
PAINLEVEL_OUTOF10: 0
PAINLEVEL_OUTOF10: 5

## 2022-07-19 ASSESSMENT — PAIN DESCRIPTION - LOCATION: LOCATION: PELVIS

## 2022-07-19 ASSESSMENT — PAIN DESCRIPTION - ONSET: ONSET: SUDDEN

## 2022-07-19 ASSESSMENT — PAIN - FUNCTIONAL ASSESSMENT
PAIN_FUNCTIONAL_ASSESSMENT: 0-10
PAIN_FUNCTIONAL_ASSESSMENT: 0-10

## 2022-07-19 ASSESSMENT — PAIN DESCRIPTION - PAIN TYPE: TYPE: ACUTE PAIN

## 2022-07-19 ASSESSMENT — PAIN DESCRIPTION - ORIENTATION: ORIENTATION: POSTERIOR

## 2022-07-19 ASSESSMENT — PAIN DESCRIPTION - DESCRIPTORS: DESCRIPTORS: ACHING
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
## 2022-07-19 NOTE — ED PROVIDER NOTES
EMERGENCY DEPARTMENT ENCOUNTER        CHIEF COMPLAINT    Chief Complaint   Patient presents with    Fall     Pt was running from police after stealing clothing from store. She fell and has abrasions to her forehead. She was resisting arrest and was brought in by EMS. HPI    Vito Pickett is a 40 y.o. female who presents with SPD following fall with head injury and right hip pain. Context is patient was reportedly running from the police after stealing clothing from a store. She tripped and fell forward, did strike her head on the ground but no reported loss of consciousness by [employer] at bedside. Did sustain abrasions to her forehead area. She was otherwise awake and alert ambulatory on scene but began complaining of right hip pain as a transported her to half-way. Patient is endorsing 5/10 throbbing sharp pain throughout the anterolateral right hip. States she has had history of previous \"hip fracture. \"  She is denying any other neck pain, pain or trauma to the chest wall, abdomen lower back or other extremities however patient is a very difficult historian, very combative and resistant to provide HPI or participate in physical exam.  She states she does not take any blood thinner medications. She is otherwise refusing to give me any history. No blood or clear fluid from ears nose or mouth. Denying any changes in her vision. REVIEW OF SYSTEMS very limited HPI as patient is a difficult historian. Constitutional:  Denies fever, chills  Neurologic:  See HPI. Denies LOC. Denies confusion or memory loss. Denies light-headedness, dizziness. Denies extremity pain, sensory changes, or weakness. Eyes:  Denies dipplopia, blurred vision, or loss visual field. Denies discharge. Ears: no ear trauma or hearing changes  Musculoskeletal:  See HPI. Cardiac: No Chest Pain, palpitations, or Chest Injury  Respiratory:  Denies cough, shortness of breath, respiratory discomfort   GI: No nausea or vomiting. No Abdominal pain or Abdominal Injury  : No Dysuria or Hematuria   Skin:  See HPI. All other review of systems are negative  See HPI and nursing notes for additional information         PAST MEDICAL & SURGICAL HISTORY    Past Medical History:   Diagnosis Date    Ankle fracture, right 08/2011    Chronic back pain     Pregnancy     2nd trimester    Syphilis     Tendinitis of right ankle      Past Surgical History:   Procedure Laterality Date    CHOLECYSTECTOMY      HERNIA REPAIR         CURRENT MEDICATIONS    Current Outpatient Rx   Medication Sig Dispense Refill    acetaminophen (TYLENOL) 500 MG tablet Take 1 tablet by mouth every 6 hours as needed for Pain 30 tablet 1    neomycin-bacitracin-polymyxin (NEOSPORIN) 400-5-5000 ointment Apply topically 2 times daily. 1 each 0    ibuprofen (ADVIL;MOTRIN) 600 MG tablet Take 1 tablet by mouth 3 times daily as needed for Pain 30 tablet 0    ferrous sulfate 325 (65 Fe) MG tablet Take 1 tablet by mouth 2 times daily 60 tablet 3       ALLERGIES    No Known Allergies    SOCIAL & FAMILY HISTORY    Social History     Socioeconomic History    Marital status: Single   Tobacco Use    Smoking status: Every Day     Packs/day: 0.25     Years: 5.00     Pack years: 1.25     Types: Cigarettes    Smokeless tobacco: Never   Substance and Sexual Activity    Alcohol use: No    Drug use: Yes     Frequency: 7.0 times per week     Types: Cocaine, Methamphetamines (Crystal Meth)     Comment: last used \"couple of days ago\" but usually uses daily     Family History   Problem Relation Age of Onset    Diabetes Maternal Grandmother        PHYSICAL EXAM    VITAL SIGNS: /80   Pulse 88   Temp 97.8 °F (36.6 °C)   Resp 16   Ht 5' 5\" (1.651 m)   Wt 210 lb (95.3 kg)   SpO2 99%   BMI 34.95 kg/m²    Constitutional:  Well developed, elevated BMI, nontoxic, sleeping comfortably on bed. Scalp: No palpable skull depressions or deformities. No swelling, discoloration. Neck:   No JVD.  Trachea is midline. No swelling or discoloration on inspection. No posterior midline neck tenderness. No pain or deficit on range of motion. Eyes: PERRL. EOMI. No nystagmus. Funduscopic exam reveals no obvious retinal hemorrhages, defects. HENT:   Above the right eyebrow and left lower forehead are 2 superficial abrasions versus skin tears, dry without pulsatile bleeding. Irregular skin margins but no obvious foreign body. No palpable crepitus or step-offs of the bony forehead or facial bones. No trismus, airway patent. EACs and TMs clear. Patient refusing to allow otoscope speculum exam  Nares patent bilaterally without clear fluid or blood. Oropharynx clear, dentition intact   No Cotto sign,  no raccoon sign. Respiratory:  Lungs Clear, no retractions   Cardiovascular: Regular rate and rhythm. Normal S1/S2. No gross chest wall deformities bruising discoloration. No tenderness palpation of the chest wall. Equal symmetrical chest wall rise. GI: No gross discoloration or bruising. Soft, nontender, normal bowel sounds  Musculoskeletal:  No edema, no acute deformities  Right lower leg: No gross bony deformities abnormal rotation or limb shortening of the right lower leg compared to the left. Tenderness over the anterolateral right hip without crepitus or step-offs. Patient refusing to actively range hip, passive range of motion testing, increased pain on hip flexion and external rotation. Compartments soft throughout the right lower leg. Extensor mechanism grossly intact. Equal dorsi/plantar flexion against resistance. Pedal pulses 2+. Integument:  Well hydrated, no petechiae   Neurologic:   Alert & oriented x3, no slurred speech, GCS 15. Cranial nerves 2-12 grossly intact. Patient combative throughout exam, refusing to follow direct commands for neuro testing, she is spontaneously moving all extremities however somewhat limited upper extremity movements as she is handcuffed to the ED cot.    Psych: patient including the area of dried blood for better assessment and possible suture repair but patient is adamantly refusing this. No evidence of traumatic injury to the chest wall, abdomen or extremities. There is tenderness to the anterolateral right hip without laxity on pelvic rock. Compartments otherwise soft throughout the right lower leg and she is neurovascular intact distally. Patient does meet no criteria for head CT per [place] head CT rules. No midline C-spine tenderness on exam.  X-ray of right hip with pelvis mention shows no evidence of acute osseous abnormality or evidence of fracture/dislocation. Old fracture deformity noted of the left superior and inferior pubic rami with internal fixation screws across SI joint without evidence of screw loosening. In the ED, patient remains awake and alert moving all extremities. We discussed supportive symptomatic care, Tylenol for pain, ice application and weightbearing as tolerated. Will be given outpatient follow-up with orthopedics and patient will be discharged to [facility]. Low clinical suspicion for intracranial bleed, spinal cord injury, spinal fracture, bony skull fracture, facial fracture, ischemic limb, compartment syndrome, intra-articular joint infection/septic arthritis, DVT, osteomyelitis, arterial/neurologic injury, necrotizing fasciitis, or infected/rapidly expanding hematoma. Patient is discharged in stable condition. Educated on [address] therapy. Provided Tylenol for pain. . Patient is instructed to followup with orthopedics in 7-10 days, sooner with worsened symptoms. Return precautions back to the ED discussed for any new or worsening symptoms. Diagnosis and plan discussed in detail with patient who understands and agrees.   Return to emergency Department precautions, which included any change in nature or severity of symptoms, development of numbness/tingling, or weakness, or any new symptoms, were discussed in detail with patient

## 2022-08-04 ENCOUNTER — HOSPITAL ENCOUNTER (OUTPATIENT)
Age: 37
Setting detail: SPECIMEN
Discharge: HOME | End: 2022-08-04
Payer: COMMERCIAL

## 2022-08-04 LAB
BACTERIA: NEGATIVE /HPF
BILIRUBIN URINE: NEGATIVE MG/DL
BLOOD, URINE: NORMAL
CLARITY: CLEAR
COLOR: YELLOW
GLUCOSE, URINE: NEGATIVE MG/DL
KETONES, URINE: NEGATIVE MG/DL
LEUKOCYTE ESTERASE, URINE: NEGATIVE
NITRITE URINE, QUANTITATIVE: NEGATIVE
PH, URINE: 7.5 (ref 5–8)
PROTEIN UA: NEGATIVE MG/DL
RBC URINE: <1 /HPF (ref 0–6)
SPECIFIC GRAVITY UA: <=1.005 (ref 1–1.03)
SQUAMOUS EPITHELIAL: 3 /HPF
TRICHOMONAS: NORMAL /HPF
UROBILINOGEN, URINE: 0.2 MG/DL (ref 0.2–1)
WBC UA: <1 /HPF (ref 0–5)

## 2022-08-04 PROCEDURE — 87086 URINE CULTURE/COLONY COUNT: CPT

## 2022-08-04 PROCEDURE — 81001 URINALYSIS AUTO W/SCOPE: CPT

## 2022-08-05 LAB
CULTURE: NORMAL
Lab: NORMAL
SPECIMEN: NORMAL

## 2023-09-13 ENCOUNTER — HOSPITAL ENCOUNTER (OUTPATIENT)
Age: 38
Setting detail: SPECIMEN
Discharge: HOME | End: 2023-09-13
Payer: COMMERCIAL

## 2023-09-13 LAB
BACTERIA: NEGATIVE /HPF
BILIRUBIN URINE: NEGATIVE MG/DL
BLOOD, URINE: NEGATIVE
CLARITY: ABNORMAL
COLOR: YELLOW
GLUCOSE, URINE: NEGATIVE MG/DL
KETONES, URINE: NEGATIVE MG/DL
LEUKOCYTE ESTERASE, URINE: ABNORMAL
MUCUS: ABNORMAL HPF
NITRITE URINE, QUANTITATIVE: NEGATIVE
PH, URINE: 8 (ref 5–8)
PROTEIN UA: NEGATIVE MG/DL
RBC URINE: 2 /HPF (ref 0–6)
SPECIFIC GRAVITY UA: 1.02 (ref 1–1.03)
SQUAMOUS EPITHELIAL: 8 /HPF
TRICHOMONAS: ABNORMAL /HPF
UROBILINOGEN, URINE: 0.2 MG/DL (ref 0.2–1)
WBC UA: 3 /HPF (ref 0–5)

## 2023-09-13 PROCEDURE — 81001 URINALYSIS AUTO W/SCOPE: CPT

## 2023-09-13 PROCEDURE — 87086 URINE CULTURE/COLONY COUNT: CPT

## 2023-09-14 LAB
CULTURE: NORMAL
Lab: NORMAL
SPECIMEN: NORMAL